# Patient Record
Sex: MALE | Race: WHITE | URBAN - METROPOLITAN AREA
[De-identification: names, ages, dates, MRNs, and addresses within clinical notes are randomized per-mention and may not be internally consistent; named-entity substitution may affect disease eponyms.]

---

## 2017-02-16 ENCOUNTER — EMERGENCY (EMERGENCY)
Facility: HOSPITAL | Age: 27
LOS: 1 days | End: 2017-02-16
Payer: COMMERCIAL

## 2017-02-16 PROCEDURE — 71020: CPT | Mod: 26

## 2017-02-16 PROCEDURE — 70450 CT HEAD/BRAIN W/O DYE: CPT | Mod: 26

## 2017-02-16 PROCEDURE — 99284 EMERGENCY DEPT VISIT MOD MDM: CPT

## 2019-09-09 ENCOUNTER — OUTPATIENT (OUTPATIENT)
Dept: OUTPATIENT SERVICES | Facility: HOSPITAL | Age: 29
LOS: 1 days | End: 2019-09-09
Payer: COMMERCIAL

## 2019-09-09 PROCEDURE — 72148 MRI LUMBAR SPINE W/O DYE: CPT | Mod: 26

## 2022-04-14 ENCOUNTER — TRANSCRIPTION ENCOUNTER (OUTPATIENT)
Age: 32
End: 2022-04-14

## 2023-01-04 PROBLEM — Z00.00 ENCOUNTER FOR PREVENTIVE HEALTH EXAMINATION: Status: ACTIVE | Noted: 2023-01-04

## 2023-01-05 ENCOUNTER — TRANSCRIPTION ENCOUNTER (OUTPATIENT)
Age: 33
End: 2023-01-05

## 2023-01-05 ENCOUNTER — APPOINTMENT (OUTPATIENT)
Dept: PAIN MANAGEMENT | Facility: CLINIC | Age: 33
End: 2023-01-05
Payer: COMMERCIAL

## 2023-01-05 VITALS — HEIGHT: 70 IN | WEIGHT: 180 LBS | BODY MASS INDEX: 25.77 KG/M2

## 2023-01-05 PROCEDURE — 99204 OFFICE O/P NEW MOD 45 MIN: CPT

## 2023-01-05 NOTE — HISTORY OF PRESENT ILLNESS
Fine to check potassium, order placed.    [Lower back] : lower back [Left Leg] : left leg [Sudden] : sudden [7] : 7 [6] : 6 [Localized] : localized [Meds] : meds [Walking] : walking [Full time] : Work status: full time [1] : 1 [Steroid] : Steroid [FreeTextEntry1] : The patient presents for initial evaluation regarding their low back pain.   Patient was referred by_.  There is radiation through the left leg down to the foot, pain distribution between leg and back is 50/50. 3 years ago in july patient was squatting and injured himself. He reinjured himself last year when bending down, which caused the pain radiation to the left leg. Patient uses the chiropractor and massage therapy for pain management. Patient reports no weakness, or loss of bladder and bowel control.\par \par Subjective weakness: No \par Lower extremity paresthesias: Yes\par Bladder/bowel dysfunction: No \par \par Injections: Yes\par \par Pertinent Surgical History: \par 1) L5-S1 discectomy (Dr. Dillard) December 2019\par \par Imaging: \par \par MRI Lumbar Spine (12/10/2022) -  [facility]\par \par L1-L2: There is no disc bulge, herniation, thecal sac compression or foraminal narrowing.\par L2-L3: There is no disc bulge, herniation, thecal sac compression or foraminal narrowing.\par L3-L4: There is no disc bulge, herniation, thecal sac compression or foraminal narrowing.\par L4-L5: Small central disc herniation L4-5 level with minimal facet arthropathy. There is mild spinal stenosis. No significant change from previous examination\par L5-S1: Postoperative changes L5-S1 level of her right hemilaminotomy defect. Postoperative changes with scarring and enhancing soft tissue in the right ventral epidural space. No recurrent disc herniation. No stenosis.\par The visualized paraspinal structures are unremarkable.\par \par Physician Disclaimer: I have personally reviewed and confirmed all HPI data with the patient.  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

## 2023-01-05 NOTE — REASON FOR VISIT
[Initial Consultation] : an initial pain management consultation [FreeTextEntry2] : lower back and left leg pain

## 2023-01-05 NOTE — PHYSICAL EXAM
[de-identified] : Constitutional:  \par - No acute distress  \par - Well developed; well nourished  \par \par Neurological:  \par - normal mood and affect  \par - alert and oriented x 3   \par \par Cardiovascular:  \par - grossly normal \par \par Lumbar Spine Exam: \par \par Inspection: Well healed surgical scar midline\par erythema (-) \par ecchymosis (-) \par rashes (-) \par alignment: no scoliosis \par \par Palpation: \par Midline lumbar tenderness:            (-) \par midline thoracic tenderness:          (-) \par Lumbar paraspinal tenderness:  L (-) ; R (-) \par thoracic paraspinal tenderness: L (-) ; R (-) \par sciatic nerve tenderness :          L (+) ; R (-) \par SI joint tenderness:                     L (-) ; R (-) \par GTB tenderness:                        L (-);  R (-) \par \par ROM: \par \par Strength: \par                                    Right       Left    \par Hip Flexion:                (5/5)       (5/5) \par Quadriceps:               (5/5)       (5/5) \par Hamstrings:                (5/5)       (5/5) \par Ankle Dorsiflexion:     (5/5)       (5/5) \par EHL:                           (5/5)       (5/5) \par Ankle Plantarflexion:  (5/5)       (5/5) \par \par Special Tests: \par SLR:                            R (-) ; L (+) \par Facet loading:             R (-) ; L (-) \par ADRIANA test:                R (-) ; L (-) \par Hamstring tightness:   R (+);  L (+) \par \par Neurologic: \par SILT throughout right lower extremity \par SILT throughout left lower extremity with exception of L5 dermatome. \par \par Reflexes normal and symmetric bilateral lower extremities \par \par Gait: \par non- antalgic gait \par ambulates without assistive device

## 2023-01-05 NOTE — DATA REVIEWED
[FreeTextEntry1] : A discussion regarding available pain management treatment options occurred with the patient.  These included interventional, rehabilitative, pharmacological, and alternative modalities. We will proceed with the following:  \par \par Interventional treatment options:  \par - Proceed with _ with fluoroscopic guidance  \par - If inadequate relief would likely consider _  \par - Hold _ for _ days after obtaining appropriate medical clearance prior to planned injection  \par - None indicated at present time  \par - see additional instructions below  \par \par Rehabilitative options:  \par - initiate trial of physical therapy  \par - continue physical therapy  \par - participation in active HEP was discussed  \par \par Medication based treatment options:  \par - initiate trial of _  \par - continue _  \par - see additional instructions below  \par \par Complementary treatment options:  \par - Weight management and lifestyle modifications discussed  \par - See additional instructions below  \par \par Additional treatment recommendations as follows:  \par - patient with follow up _ \par \par I, Jeff Perez acting as scribe, attest that this documentation has been prepared under the direction and in the presence of Provider Rubén De Oliveira DO. \par \par The documentation recorded by the scribe, in my presence, accurately reflects the service I personally performed and the decisions made by me with my edits as appropriate.  [MRI] : MRI [Lumbar Spine] : lumbar spine [Report was reviewed and noted in the chart] : The report was reviewed and noted in the chart [I reviewed the films/CD] : I reviewed the films/CD

## 2023-01-05 NOTE — ASSESSMENT
[FreeTextEntry1] : A discussion regarding available pain management treatment options occurred with the patient.  These included interventional, rehabilitative, pharmacological, and alternative modalities. We will proceed with the following:  \par \par Interventional treatment options:  \par - Proceed with left L5-S1 TFESI with fluoroscopic guidance\par - see additional instructions below  \par \par Rehabilitative options:    \par - participation in active HEP was discussed  \par \par Medication based treatment options:  \par - initiate trial of meloxicam 15 mg daily x 7-10 days then as needed\par - Advise he D/C gabapentin\par - see additional instructions below  \par \par Complementary treatment options:  \par - lifestyle modifications discussed   \par \par Additional treatment recommendations as follows:  \par - Follow up 1-2 weeks post injection for assessment of efficacy and further recommendations.\par \par The risks, benefits and alternatives of the proposed procedure were explained in detail with the patient. The risks outlined include but are not limited to infection, bleeding, post- dural puncture headache, nerve injury, a temporary increase in pain, failure to resolve symptoms, allergic reaction, and possible elevation of blood sugar in diabetics if using corticosteroid.  All questions were answered to patient's apparent satisfaction and he/she verbalized an understanding.\par \par I, Jeff Perez acting as scribe, attest that this documentation has been prepared under the direction and in the presence of Provider Rubén De Oliveira DO. \par \par The documentation recorded by the scribe, in my presence, accurately reflects the service I personally performed and the decisions made by me with my edits as appropriate.

## 2023-01-11 ENCOUNTER — APPOINTMENT (OUTPATIENT)
Dept: PAIN MANAGEMENT | Facility: CLINIC | Age: 33
End: 2023-01-11
Payer: COMMERCIAL

## 2023-01-11 PROCEDURE — 64483 NJX AA&/STRD TFRM EPI L/S 1: CPT | Mod: LT

## 2023-01-11 NOTE — PROCEDURE
[FreeTextEntry3] : Date of Service: 01/11/2023 \par \par Account: [id number]\par \par Patient: JINNY MENDEZ \par \par YOB: 1990\par \par Age: 32 year\par \par Surgeon:      Rubén De Oliveira DO\par \par Assistant:    None\par \par Pre-Operative Diagnosis:         Lumbosacral Radiculitis (M54.17)\par \par Post Operative Diagnosis:       Same\par \par Procedure:             Left L5-S1 transforaminal epidural steroid injection under fluoroscopic guidance.\par \par Anesthesia:           MAC\par \par This procedure was carried out using fluoroscopic guidance.  The risks and benefits of the procedure were discussed extensively with the patient.  The consent of the patient was obtained and the following procedure was performed.  The patient was placed in the prone position on the fluoroscopy table and the area was prepped and draped in a sterile fashion.  A timeout was performed with all essential staff present and the site and side were verified.\par \par The left L5-S1 neural foramen was then identified on right oblique "irma dog" anatomical view at the 6 o' clock position using fluoroscopic guidance, and the area was marked. The overlying skin and subcutaneous structures were anesthetized using sterile technique with 1% Lidocaine.   A 22 gauge 3.5 inch spinal needle was directed toward the inferior (6 o'clock) position of the pedicle, which formed the roof of the identified foramen.  Once in the epidural space, after negative aspiration for heme and CSF, 1cc of Omnipaque contrast was injected to confirm epidural location and assess filling defects and rule out intravascular needle placement.\par \par Lumbar epidurogram showed no intravascular or intrathecal flow pattern.  No blood or CSF was aspirated.  Omnipaque spread medially in epidural space and outlined the exiting nerve root. \par \par After this, an injectate of 3 cc preservative free normal saline plus 40 mg of Kenalog was injected in the epidural space.\par \par The needle was subsequently removed.  Vital signs remained normal.  Pulse oximeter was used throughout the procedure and the patient's pulse and oxygen saturation remained within normal limits.  The patient tolerated the procedure well.  There were no complications.  The patient was instructed to apply ice over the injection sites for twenty minutes every two hours for the next 24 to 48 hours.\par \par Disposition:\par      1. The patient was advised to F/U in 1-2 weeks to assess the response to the injection.\par      2. The patient was also instructed to contact me immediately if there were any concerns related to the procedure performed.

## 2023-01-31 ENCOUNTER — APPOINTMENT (OUTPATIENT)
Dept: PAIN MANAGEMENT | Facility: CLINIC | Age: 33
End: 2023-01-31
Payer: COMMERCIAL

## 2023-01-31 VITALS — WEIGHT: 180 LBS | HEIGHT: 70 IN | BODY MASS INDEX: 25.77 KG/M2

## 2023-01-31 PROCEDURE — 99214 OFFICE O/P EST MOD 30 MIN: CPT

## 2023-01-31 NOTE — ASSESSMENT
[FreeTextEntry1] : A discussion regarding available pain management treatment options occurred with the patient.  These included interventional, rehabilitative, pharmacological, and alternative modalities. We will proceed with the following:  \par \par Interventional treatment options:  \par - Proceed with left L5-S1, S1 TFESI (80mg Kenalog) with fluoroscopic guidance\par - see additional instructions below  \par \par Rehabilitative options:    \par - participation in active HEP was discussed  \par \par Medication based treatment options:  \par - continue meloxicam 15 mg daily as needed\par - see additional instructions below  \par \par Complementary treatment options:  \par - lifestyle modifications discussed   \par \par Additional treatment recommendations as follows:  \par - Follow up 1-2 weeks post injection for assessment of efficacy and further recommendations.\par \par The risks, benefits and alternatives of the proposed procedure were explained in detail with the patient. The risks outlined include but are not limited to infection, bleeding, post- dural puncture headache, nerve injury, a temporary increase in pain, failure to resolve symptoms, allergic reaction, and possible elevation of blood sugar in diabetics if using corticosteroid.  All questions were answered to patient's apparent satisfaction and he/she verbalized an understanding.\par \par I, Jeff Perez acting as scribe, attest that this documentation has been prepared under the direction and in the presence of Provider Rubén De Oliveira DO. \par \par The documentation recorded by the scribe, in my presence, accurately reflects the service I personally performed and the decisions made by me with my edits as appropriate.

## 2023-01-31 NOTE — PHYSICAL EXAM
[de-identified] : Constitutional:  \par - No acute distress  \par - Well developed; well nourished  \par \par Neurological:  \par - normal mood and affect  \par - alert and oriented x 3   \par \par Cardiovascular:  \par - grossly normal \par \par Lumbar Spine Exam: \par \par Inspection: Well healed surgical scar midline\par erythema (-) \par ecchymosis (-) \par rashes (-) \par alignment: no scoliosis \par \par Palpation: \par Midline lumbar tenderness:            (-) \par midline thoracic tenderness:          (-) \par Lumbar paraspinal tenderness:  L (-) ; R (-) \par thoracic paraspinal tenderness: L (-) ; R (-) \par sciatic nerve tenderness :          L (+) ; R (-) \par SI joint tenderness:                     L (-) ; R (-) \par GTB tenderness:                        L (-);  R (-) \par \par ROM: \par \par Strength: \par                                    Right       Left    \par Hip Flexion:                (5/5)       (5/5) \par Quadriceps:               (5/5)       (5/5) \par Hamstrings:                (5/5)       (5/5) \par Ankle Dorsiflexion:     (5/5)       (5/5) \par EHL:                           (5/5)       (5/5) \par Ankle Plantarflexion:  (5/5)       (5/5) \par \par Special Tests: \par SLR:                            R (-) ; L (+) \par Facet loading:             R (-) ; L (-) \par ADRIANA test:                R (-) ; L (-) \par Hamstring tightness:   R (+);  L (+) \par \par Neurologic: \par SILT throughout right lower extremity \par SILT throughout left lower extremity with exception of L5 dermatome. \par \par Reflexes normal and symmetric bilateral lower extremities \par \par Gait: \par non- antalgic gait \par ambulates without assistive device

## 2023-01-31 NOTE — REASON FOR VISIT
[Follow-Up Visit] : a follow-up pain management visit [FreeTextEntry2] : lower back and left leg pain

## 2023-01-31 NOTE — HISTORY OF PRESENT ILLNESS
[Lower back] : lower back [Left Leg] : left leg [Sudden] : sudden [7] : 7 [6] : 6 [Localized] : localized [Meds] : meds [Walking] : walking [Full time] : Work status: full time [1] : 1 [Steroid] : Steroid [FreeTextEntry1] : 1/31/2023 - Patient presents to the office for  FUV s/p left L5-S1 TFESI.  Patient reports a reduction in the radiating left leg symptoms (50% pain reduction) with some returning of pain to the rightl eg.  The pain is more focalized to the lower back. He also states the radiating pain has migrated to the back of the leg in the calf. \par \par 01/05/2023 - The patient presents for initial evaluation regarding their low back pain.   Patient was referred by_.  There is radiation through the left leg down to the foot, pain distribution between leg and back is 50/50. 3 years ago in july patient was squatting and injured himself. He reinjured himself last year when bending down, which caused the pain radiation to the left leg. Patient uses the chiropractor and massage therapy for pain management. Patient reports no weakness, or loss of bladder and bowel control.\par \par Injections:\par 1) left L5-S1 TFESI (1/11/2023)\par \par Pertinent Surgical History: \par 1) L5-S1 discectomy (Dr. Dillard) December 2019\par \par Imaging: \par \par MRI Lumbar Spine (12/10/2022) -  [facility]\par \par L1-L2: There is no disc bulge, herniation, thecal sac compression or foraminal narrowing.\par L2-L3: There is no disc bulge, herniation, thecal sac compression or foraminal narrowing.\par L3-L4: There is no disc bulge, herniation, thecal sac compression or foraminal narrowing.\par L4-L5: Small central disc herniation L4-5 level with minimal facet arthropathy. There is mild spinal stenosis. No significant change from previous examination\par L5-S1: Postoperative changes L5-S1 level of her right hemilaminotomy defect. Postoperative changes with scarring and enhancing soft tissue in the right ventral epidural space. No recurrent disc herniation. No stenosis.\par The visualized paraspinal structures are unremarkable.\par \par Physician Disclaimer: I have personally reviewed and confirmed all HPI data with the patient.  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

## 2023-02-22 ENCOUNTER — APPOINTMENT (OUTPATIENT)
Dept: PAIN MANAGEMENT | Facility: CLINIC | Age: 33
End: 2023-02-22
Payer: COMMERCIAL

## 2023-02-22 PROCEDURE — 64484 NJX AA&/STRD TFRM EPI L/S EA: CPT | Mod: LT,59

## 2023-02-22 PROCEDURE — 64483 NJX AA&/STRD TFRM EPI L/S 1: CPT | Mod: LT

## 2023-02-22 NOTE — PROCEDURE
[FreeTextEntry3] : Date of Service: 02/22/2023 \par \par Account: [id number]\par \par Patient: JINNY MENDEZ \par \par YOB: 1990\par \par Age: 32 year\par \par Surgeon:   Rubén De Oliveira DO\par \par Assistant:  None\par \par Pre-Operative Diagnosis:         Lumbosacral Radiculitis (M54.17)\par \par Post Operative Diagnosis:       Lumbosacral Radiculitis (M54.17)\par \par Procedure:             Left L5-S1, S1 transforaminal epidural steroid injection under fluoroscopic guidance.\par \par Anesthesia:            MAC\par \par This procedure was carried out using fluoroscopic guidance.  The risks and benefits of the procedure were discussed extensively with the patient.  The consent of the patient was obtained and the following procedure was performed. The patient was placed in the prone position on the fluoroscopy table and the area was prepped and draped in a sterile fashion.  A timeout was performed with all essential staff present and the site and side were verified.\par \par The left L5-S1 neural foramen was then identified on right oblique "irma dog" anatomical view at the 6 o' clock position using fluoroscopic guidance, and the area was marked. The overlying skin and subcutaneous structures were anesthetized using sterile technique with 1% Lidocaine.   A 22 gauge 3.5 inch spinal needle was directed toward the inferior (6 o'clock) position of the pedicle, which formed the roof of the identified foramen.  Once in the epidural space, after negative aspiration for heme and CSF, 1cc of Omnipaque contrast was injected to confirm epidural location and assess filling defects and rule out intravascular needle placement.\par \par Lumbar epidurogram showed no intravascular or intrathecal flow pattern.  No blood or CSF was aspirated.  Omnipaque spread medially in epidural space and outlined the exiting nerve root.\par \par After this, 2.5 cc of a mixture of 3 cc of preservative free normal saline plus 80 mg of Kenalog was injected in the epidural space\par \par The left S1 neural foramen was then identified on left oblique anatomical view at the 6 o' clock position of the S1 pedicle using fluoroscopic guidance, and the area was marked. The overlying skin and subcutaneous structures were anesthetized using sterile technique with 1% Lidocaine.  A 22 gauge 3.5 inch spinal needle was directed toward the inferior (6 o'clock) position of the pedicle, which formed the roof of the identified foramen.  Once in the epidural space, after negative aspiration for heme and CSF, 1cc of Omnipaque contrast was injected to confirm epidural location and assess filling defects and rule out intravascular needle placement.\par \par The following contrast flow and epidurogram was observed: no intravascular or intrathecal flow pattern was noted.  No blood or CSF was aspirated. Omnipaque spread appeared to outline the left S1 nerve root and spread medially into the epidural space. \par \par After this, the remainder of the injectate listed above was injected in the epidural space.\par \par Vital signs remained normal throughout the procedure.  The patient tolerated the procedure well.  There were no immediate complications from the performed procedure.  The patient was instructed to apply ice over the injection sites for twenty minutes every two hours for the next 24 hours.\par \par Disposition:\par      1. The patient was advised to F/U in 1-2 weeks to assess the response to the injection.\par      2. The patient was also instructed to contact me immediately if there were any concerns related to the procedure performed.

## 2023-03-14 ENCOUNTER — APPOINTMENT (OUTPATIENT)
Dept: PAIN MANAGEMENT | Facility: CLINIC | Age: 33
End: 2023-03-14
Payer: COMMERCIAL

## 2023-03-14 VITALS — BODY MASS INDEX: 25.77 KG/M2 | HEIGHT: 70 IN | WEIGHT: 180 LBS

## 2023-03-14 DIAGNOSIS — M54.16 RADICULOPATHY, LUMBAR REGION: ICD-10-CM

## 2023-03-14 DIAGNOSIS — M51.26 OTHER INTERVERTEBRAL DISC DISPLACEMENT, LUMBAR REGION: ICD-10-CM

## 2023-03-14 DIAGNOSIS — Z98.890 OTHER SPECIFIED POSTPROCEDURAL STATES: ICD-10-CM

## 2023-03-14 PROCEDURE — 99213 OFFICE O/P EST LOW 20 MIN: CPT

## 2023-03-15 NOTE — ASSESSMENT
[FreeTextEntry1] : A discussion regarding available pain management treatment options occurred with the patient.  These included interventional, rehabilitative, pharmacological, and alternative modalities. We will proceed with the following:  \par \par Interventional treatment options:  \par - none indicated at this time\par - consider repeat left L5-S1, S1 TFESI (80mg Kenalog) with return of severe pain\par - discussed limitations of corticosteroid administration\par - see additional instructions below  \par \par Rehabilitative options:    \par - restart PT; focus on flexibility and core strengthening\par - participation in active HEP was discussed  \par \par Medication based treatment options:  \par - continue meloxicam 15 mg daily as needed\par - see additional instructions below  \par \par Complementary treatment options:  \par - lifestyle modifications discussed   \par \par Additional treatment recommendations as follows:  \par - f/u in 3 months or PRN basis\par - f/u w/ Dr Dillard as directed\par \par We have discussed the risks, benefits, and alternatives NSAID therapy including but not limited to the risk of bleeding, thrombosis, gastric mucosal irritation/ulceration, allergic reaction and kidney dysfunction; the patient verbalizes an understanding.\par \par I, Cuco FUNG, personally performed the services described in this documentation incident to Rubén De Oliveira DO.\par \par The documentation recorded by the scribe, in my presence, accurately reflects the service I personally performed and the decisions made by me with my edits as appropriate.

## 2023-03-15 NOTE — HISTORY OF PRESENT ILLNESS
[Lower back] : lower back [Left Leg] : left leg [Sudden] : sudden [7] : 7 [6] : 6 [Localized] : localized [Meds] : meds [Walking] : walking [Full time] : Work status: full time [1] : 1 [Steroid] : Steroid [FreeTextEntry1] : 3/14/2023 - Patient presents for FUV after a L5-S1, S1 TFESI on 2/22/2023.  Patient reports >70% reduction in back pain.  He c/o present left foot paraesthesias.  His main concern at this point is his paraesthesias in the left foot and decreased mobility.  He has tried multiple physical therapist to assess his mobility but there hasn’t been significant improvement with treatment.  He was able to snowboard this past weekend with help from the [facility] but overall feels his pain with mobility really limits him.  Overall he is pleased with his response to the procedure.\par \par 1/31/2023 - Patient presents to the office for  FUV s/p left L5-S1 TFESI.  Patient reports a reduction in the radiating left leg symptoms (50% pain reduction) with some returning of pain to the right leg.  The pain is more focalized to the lower back. He also states the radiating pain has migrated to the back of the leg in the calf. \par \par 01/05/2023 - The patient presents for initial evaluation regarding their low back pain.   Patient was referred by_.  There is radiation through the left leg down to the foot, pain distribution between leg and back is 50/50. 3 years ago in july patient was squatting and injured himself. He reinjured himself last year when bending down, which caused the pain radiation to the left leg. Patient uses the chiropractor and massage therapy for pain management. Patient reports no weakness, or loss of bladder and bowel control.\par \par Injections:\par 1) left L5-S1 TFESI (1/11/2023\par 2) left L5-S1, S1 TFESI (2/22/2023)\par \par Pertinent Surgical History: \par 1) L5-S1 discectomy (Dr. Dillard) December 2019\par \par Imaging: \par MRI Lumbar Spine (12/10/2022) -  [facility]\par \par L4-L5: Small central disc herniation L4-5 level with minimal facet arthropathy. There is mild spinal stenosis. No significant change from previous examination\par L5-S1: Postoperative changes L5-S1 level of her right hemilaminotomy defect. Postoperative changes with scarring and enhancing soft tissue in the right ventral epidural space. No recurrent disc herniation. No stenosis.\par The visualized paraspinal structures are unremarkable.\par \par Physician Disclaimer: I have personally reviewed and confirmed all HPI data with the patient.  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

## 2023-03-15 NOTE — PHYSICAL EXAM
[de-identified] : Constitutional:  \par - No acute distress  \par - Well developed; well nourished  \par \par Neurological:  \par - normal mood and affect  \par - alert and oriented x 3   \par \par Cardiovascular:  \par - grossly normal \par \par Lumbar Spine Exam: \par \par Inspection: Well healed surgical scar midline\par erythema (-) \par ecchymosis (-) \par rashes (-) \par alignment: no scoliosis \par \par Palpation: \par Midline lumbar tenderness:            (-) \par midline thoracic tenderness:          (-) \par Lumbar paraspinal tenderness:  L (-) ; R (-) \par thoracic paraspinal tenderness: L (-) ; R (-) \par sciatic nerve tenderness :          L (+) ; R (-) \par SI joint tenderness:                     L (-) ; R (-) \par GTB tenderness:                        L (-);  R (-) \par \par ROM: Diminished in all planes-improved from previous visits\par \par Strength: \par                                    Right       Left    \par Hip Flexion:                (5/5)       (5/5) \par Quadriceps:               (5/5)       (5/5) \par Hamstrings:                (5/5)       (5/5) \par Ankle Dorsiflexion:     (5/5)       (5/5) \par EHL:                           (5/5)       (5/5) \par Ankle Plantarflexion:  (5/5)       (5/5) \par \par Special Tests: \par SLR:                            R (-) ; L (eq) \par Facet loading:             R (-) ; L (-) \par ADRIANA test:                R (-) ; L (-) \par Hamstring tightness:   R (+);  L (+) \par \par Neurologic: \par SILT throughout right lower extremity \par SILT throughout left lower extremity with exception of L5 dermatome. \par \par Reflexes normal and symmetric bilateral lower extremities \par \par Gait: \par non- antalgic gait \par ambulates without assistive device

## 2023-04-03 ENCOUNTER — RX RENEWAL (OUTPATIENT)
Age: 33
End: 2023-04-03

## 2023-04-03 RX ORDER — MELOXICAM 15 MG/1
15 TABLET ORAL
Qty: 30 | Refills: 2 | Status: ACTIVE | COMMUNITY
Start: 2023-01-05 | End: 1900-01-01